# Patient Record
Sex: MALE | Race: WHITE
[De-identification: names, ages, dates, MRNs, and addresses within clinical notes are randomized per-mention and may not be internally consistent; named-entity substitution may affect disease eponyms.]

---

## 2022-02-18 ENCOUNTER — DASHBOARD ENCOUNTERS (OUTPATIENT)
Age: 67
End: 2022-02-18

## 2022-02-21 ENCOUNTER — LAB OUTSIDE AN ENCOUNTER (OUTPATIENT)
Dept: URBAN - NONMETROPOLITAN AREA CLINIC 2 | Facility: CLINIC | Age: 67
End: 2022-02-21

## 2022-02-21 ENCOUNTER — WEB ENCOUNTER (OUTPATIENT)
Dept: URBAN - NONMETROPOLITAN AREA CLINIC 2 | Facility: CLINIC | Age: 67
End: 2022-02-21

## 2022-02-21 ENCOUNTER — OFFICE VISIT (OUTPATIENT)
Dept: URBAN - NONMETROPOLITAN AREA CLINIC 2 | Facility: CLINIC | Age: 67
End: 2022-02-21
Payer: MEDICARE

## 2022-02-21 VITALS
HEIGHT: 69 IN | DIASTOLIC BLOOD PRESSURE: 90 MMHG | BODY MASS INDEX: 41 KG/M2 | TEMPERATURE: 97.7 F | HEART RATE: 90 BPM | WEIGHT: 276.8 LBS | SYSTOLIC BLOOD PRESSURE: 156 MMHG

## 2022-02-21 DIAGNOSIS — Z12.11 COLON CANCER SCREENING: ICD-10-CM

## 2022-02-21 DIAGNOSIS — K21.9 GERD WITHOUT ESOPHAGITIS: ICD-10-CM

## 2022-02-21 PROBLEM — 266435005: Status: ACTIVE | Noted: 2022-02-21

## 2022-02-21 PROCEDURE — 99204 OFFICE O/P NEW MOD 45 MIN: CPT | Performed by: NURSE PRACTITIONER

## 2022-02-21 RX ORDER — ADALIMUMAB 40MG/0.4ML
KIT SUBCUTANEOUS
Qty: 2 | Status: ACTIVE | COMMUNITY

## 2022-02-21 RX ORDER — FAMOTIDINE 20 MG/1
1 TABLET AT BEDTIME AS NEEDED TABLET, FILM COATED ORAL ONCE A DAY
Qty: 90 TABLET | Refills: 11 | OUTPATIENT
Start: 2022-02-21

## 2022-02-21 RX ORDER — OMEPRAZOLE 20 MG/1
CAPSULE, DELAYED RELEASE ORAL
Qty: 90 | Status: ACTIVE | COMMUNITY

## 2022-02-21 RX ORDER — DILTIAZEM HYDROCHLORIDE 240 MG/1
CAPSULE, EXTENDED RELEASE ORAL
Qty: 90 | Status: ACTIVE | COMMUNITY

## 2022-02-21 RX ORDER — NAPROXEN 500 MG/1
TABLET ORAL
Qty: 60 | Status: ACTIVE | COMMUNITY

## 2022-02-21 RX ORDER — LISINOPRIL 40 MG/1
TABLET ORAL
Qty: 90 | Status: ACTIVE | COMMUNITY

## 2022-02-21 NOTE — HPI-TODAY'S VISIT:
2/21/2022 Mr. Balaji German is a 66 year old male referred by Dr Harris for screening colonoscopy and GERD. His last colonoscopy was [address] Calvert. This showed sigmoid diverticulosis. He denies any family hx or personal hx of colon polyps or cancer. He denies any problems with constipation or diarrhea. He takes a fiber supplement daily. He denies any blood in his stool. He has reflux well controlled with omeprazole 20mg daily. EGD in 2011 showed esophagitis and hiatal hernia. He will ocassionally have breakthrough covered by [employer]. He takes NSAIDs for arthritis and does not take with good. CS

## 2022-04-19 ENCOUNTER — OFFICE VISIT (OUTPATIENT)
Dept: URBAN - NONMETROPOLITAN AREA SURGERY CENTER 1 | Facility: SURGERY CENTER | Age: 67
End: 2022-04-19
Payer: MEDICARE

## 2022-04-19 ENCOUNTER — CLAIMS CREATED FROM THE CLAIM WINDOW (OUTPATIENT)
Dept: URBAN - METROPOLITAN AREA CLINIC 4 | Facility: CLINIC | Age: 67
End: 2022-04-19
Payer: MEDICARE

## 2022-04-19 DIAGNOSIS — K63.89 CYST OF DUODENUM: ICD-10-CM

## 2022-04-19 DIAGNOSIS — D12.4 ADENOMA OF DESCENDING COLON: ICD-10-CM

## 2022-04-19 DIAGNOSIS — D12.2 ADENOMA OF ASCENDING COLON: ICD-10-CM

## 2022-04-19 DIAGNOSIS — D12.2 BENIGN NEOPLASM OF ASCENDING COLON: ICD-10-CM

## 2022-04-19 DIAGNOSIS — D12.8 BENIGN NEOPLASM OF RECTUM: ICD-10-CM

## 2022-04-19 DIAGNOSIS — Z12.11 COLON CANCER SCREENING: ICD-10-CM

## 2022-04-19 PROCEDURE — G8907 PT DOC NO EVENTS ON DISCHARG: HCPCS | Performed by: INTERNAL MEDICINE

## 2022-04-19 PROCEDURE — 45385 COLONOSCOPY W/LESION REMOVAL: CPT | Performed by: INTERNAL MEDICINE

## 2022-04-19 PROCEDURE — 88305 TISSUE EXAM BY PATHOLOGIST: CPT | Performed by: PATHOLOGY

## 2022-05-13 ENCOUNTER — TELEPHONE ENCOUNTER (OUTPATIENT)
Dept: URBAN - NONMETROPOLITAN AREA CLINIC 2 | Facility: CLINIC | Age: 67
End: 2022-05-13